# Patient Record
Sex: MALE | Race: WHITE | NOT HISPANIC OR LATINO
[De-identification: names, ages, dates, MRNs, and addresses within clinical notes are randomized per-mention and may not be internally consistent; named-entity substitution may affect disease eponyms.]

---

## 2017-06-26 ENCOUNTER — TRANSCRIPTION ENCOUNTER (OUTPATIENT)
Age: 27
End: 2017-06-26

## 2017-06-26 ENCOUNTER — INPATIENT (INPATIENT)
Facility: HOSPITAL | Age: 27
LOS: 1 days | Discharge: HOME | End: 2017-06-28
Attending: NEUROLOGICAL SURGERY | Admitting: NEUROLOGICAL SURGERY
Payer: COMMERCIAL

## 2017-06-26 VITALS
SYSTOLIC BLOOD PRESSURE: 123 MMHG | DIASTOLIC BLOOD PRESSURE: 78 MMHG | HEART RATE: 81 BPM | TEMPERATURE: 98 F | RESPIRATION RATE: 16 BRPM | OXYGEN SATURATION: 97 %

## 2017-06-26 NOTE — ED ADULT TRIAGE NOTE - CHIEF COMPLAINT QUOTE
PT c/o shunt malfunction. pt states has shunt for ICP and noticed swelling in the face and headache x 1 week. pt states now has unbearable headache.

## 2017-06-27 DIAGNOSIS — G91.9 HYDROCEPHALUS, UNSPECIFIED: ICD-10-CM

## 2017-06-27 DIAGNOSIS — T85.09XA OTHER MECHANICAL COMPLICATION OF VENTRICULAR INTRACRANIAL (COMMUNICATING) SHUNT, INITIAL ENCOUNTER: ICD-10-CM

## 2017-06-27 DIAGNOSIS — T85.09XA OTHER MECHANICAL COMPLICATION OF VENTRICULAR INTRACRANIAL (COMMUNICATING) SHUNT, INITIAL ENCOUNTER: Chronic | ICD-10-CM

## 2017-06-27 LAB
ALBUMIN SERPL ELPH-MCNC: 4.3 G/DL — SIGNIFICANT CHANGE UP (ref 3.3–5)
ALP SERPL-CCNC: 84 U/L — SIGNIFICANT CHANGE UP (ref 40–120)
ALT FLD-CCNC: 38 U/L — SIGNIFICANT CHANGE UP (ref 4–41)
APTT BLD: 32.1 SEC — SIGNIFICANT CHANGE UP (ref 27.5–37.4)
AST SERPL-CCNC: 28 U/L — SIGNIFICANT CHANGE UP (ref 4–40)
BASOPHILS # BLD AUTO: 0.02 K/UL — SIGNIFICANT CHANGE UP (ref 0–0.2)
BASOPHILS NFR BLD AUTO: 0.2 % — SIGNIFICANT CHANGE UP (ref 0–2)
BILIRUB SERPL-MCNC: 0.2 MG/DL — SIGNIFICANT CHANGE UP (ref 0.2–1.2)
BLD GP AB SCN SERPL QL: NEGATIVE — SIGNIFICANT CHANGE UP
BUN SERPL-MCNC: 23 MG/DL — SIGNIFICANT CHANGE UP (ref 7–23)
CALCIUM SERPL-MCNC: 9.3 MG/DL — SIGNIFICANT CHANGE UP (ref 8.4–10.5)
CHLORIDE SERPL-SCNC: 105 MMOL/L — SIGNIFICANT CHANGE UP (ref 98–107)
CLARITY CSF: SIGNIFICANT CHANGE UP
CO2 SERPL-SCNC: 24 MMOL/L — SIGNIFICANT CHANGE UP (ref 22–31)
COLOR CSF: SIGNIFICANT CHANGE UP
CREAT SERPL-MCNC: 1.14 MG/DL — SIGNIFICANT CHANGE UP (ref 0.5–1.3)
EOSINOPHIL # BLD AUTO: 0.17 K/UL — SIGNIFICANT CHANGE UP (ref 0–0.5)
EOSINOPHIL NFR BLD AUTO: 2 % — SIGNIFICANT CHANGE UP (ref 0–6)
GLUCOSE CSF-MCNC: 54 MG/DL — SIGNIFICANT CHANGE UP (ref 40–70)
GLUCOSE SERPL-MCNC: 86 MG/DL — SIGNIFICANT CHANGE UP (ref 70–99)
GRAM STN CSF: SIGNIFICANT CHANGE UP
HCT VFR BLD CALC: 50.2 % — HIGH (ref 39–50)
HGB BLD-MCNC: 17.1 G/DL — HIGH (ref 13–17)
IMM GRANULOCYTES NFR BLD AUTO: 0.5 % — SIGNIFICANT CHANGE UP (ref 0–1.5)
INR BLD: 1 — SIGNIFICANT CHANGE UP (ref 0.88–1.17)
LYMPHOCYTES # BLD AUTO: 2.64 K/UL — SIGNIFICANT CHANGE UP (ref 1–3.3)
LYMPHOCYTES # BLD AUTO: 31.7 % — SIGNIFICANT CHANGE UP (ref 13–44)
LYMPHOCYTES # CSF: 49 % — SIGNIFICANT CHANGE UP
MCHC RBC-ENTMCNC: 31 PG — SIGNIFICANT CHANGE UP (ref 27–34)
MCHC RBC-ENTMCNC: 34.1 % — SIGNIFICANT CHANGE UP (ref 32–36)
MCV RBC AUTO: 91.1 FL — SIGNIFICANT CHANGE UP (ref 80–100)
MONOCYTES # BLD AUTO: 0.7 K/UL — SIGNIFICANT CHANGE UP (ref 0–0.9)
MONOCYTES # CSF: 10 % — SIGNIFICANT CHANGE UP
MONOCYTES NFR BLD AUTO: 8.4 % — SIGNIFICANT CHANGE UP (ref 2–14)
NEUTROPHILS # BLD AUTO: 4.75 K/UL — SIGNIFICANT CHANGE UP (ref 1.8–7.4)
NEUTROPHILS NFR BLD AUTO: 57.2 % — SIGNIFICANT CHANGE UP (ref 43–77)
NEUTS SEG NFR CSF MANUAL: 41 % — SIGNIFICANT CHANGE UP
NRBC NFR CSF: 6 CELL/UL — HIGH (ref 0–5)
PLATELET # BLD AUTO: 235 K/UL — SIGNIFICANT CHANGE UP (ref 150–400)
PMV BLD: 9.6 FL — SIGNIFICANT CHANGE UP (ref 7–13)
POTASSIUM SERPL-MCNC: 4.3 MMOL/L — SIGNIFICANT CHANGE UP (ref 3.5–5.3)
POTASSIUM SERPL-SCNC: 4.3 MMOL/L — SIGNIFICANT CHANGE UP (ref 3.5–5.3)
PROT CSF-MCNC: 47.9 MG/DL — HIGH (ref 15–45)
PROT SERPL-MCNC: 7.3 G/DL — SIGNIFICANT CHANGE UP (ref 6–8.3)
PROTHROM AB SERPL-ACNC: 11.2 SEC — SIGNIFICANT CHANGE UP (ref 9.8–13.1)
RBC # BLD: 5.51 M/UL — SIGNIFICANT CHANGE UP (ref 4.2–5.8)
RBC # CSF: 4000 CELL/UL — HIGH (ref 0–0)
RBC # FLD: 12 % — SIGNIFICANT CHANGE UP (ref 10.3–14.5)
RH IG SCN BLD-IMP: POSITIVE — SIGNIFICANT CHANGE UP
RH IG SCN BLD-IMP: POSITIVE — SIGNIFICANT CHANGE UP
SODIUM SERPL-SCNC: 142 MMOL/L — SIGNIFICANT CHANGE UP (ref 135–145)
SPECIMEN SOURCE: SIGNIFICANT CHANGE UP
TOTAL CELLS COUNTED, SPINAL FLUID: 100 CELLS — SIGNIFICANT CHANGE UP
WBC # BLD: 8.32 K/UL — SIGNIFICANT CHANGE UP (ref 3.8–10.5)
WBC # FLD AUTO: 8.32 K/UL — SIGNIFICANT CHANGE UP (ref 3.8–10.5)
XANTHOCHROMIA: SIGNIFICANT CHANGE UP

## 2017-06-27 PROCEDURE — 70450 CT HEAD/BRAIN W/O DYE: CPT | Mod: 26

## 2017-06-27 PROCEDURE — 70250 X-RAY EXAM OF SKULL: CPT | Mod: 26

## 2017-06-27 PROCEDURE — 99223 1ST HOSP IP/OBS HIGH 75: CPT | Mod: 25

## 2017-06-27 PROCEDURE — 74000: CPT | Mod: 26

## 2017-06-27 PROCEDURE — 71020: CPT | Mod: 26

## 2017-06-27 RX ORDER — FENTANYL CITRATE 50 UG/ML
50 INJECTION INTRAVENOUS
Qty: 0 | Refills: 0 | Status: DISCONTINUED | OUTPATIENT
Start: 2017-06-27 | End: 2017-06-28

## 2017-06-27 RX ORDER — CEFAZOLIN SODIUM 1 G
2000 VIAL (EA) INJECTION EVERY 8 HOURS
Qty: 0 | Refills: 0 | Status: COMPLETED | OUTPATIENT
Start: 2017-06-27 | End: 2017-06-28

## 2017-06-27 RX ORDER — ACETAMINOPHEN 500 MG
650 TABLET ORAL EVERY 6 HOURS
Qty: 0 | Refills: 0 | Status: DISCONTINUED | OUTPATIENT
Start: 2017-06-27 | End: 2017-06-27

## 2017-06-27 RX ORDER — SODIUM CHLORIDE 9 MG/ML
1000 INJECTION INTRAMUSCULAR; INTRAVENOUS; SUBCUTANEOUS
Qty: 0 | Refills: 0 | Status: DISCONTINUED | OUTPATIENT
Start: 2017-06-27 | End: 2017-06-28

## 2017-06-27 RX ORDER — OXYCODONE HYDROCHLORIDE 5 MG/1
5 TABLET ORAL EVERY 4 HOURS
Qty: 0 | Refills: 0 | Status: DISCONTINUED | OUTPATIENT
Start: 2017-06-27 | End: 2017-06-28

## 2017-06-27 RX ORDER — ONDANSETRON 8 MG/1
4 TABLET, FILM COATED ORAL ONCE
Qty: 0 | Refills: 0 | Status: DISCONTINUED | OUTPATIENT
Start: 2017-06-27 | End: 2017-06-28

## 2017-06-27 RX ORDER — ACETAMINOPHEN 500 MG
650 TABLET ORAL EVERY 6 HOURS
Qty: 0 | Refills: 0 | Status: DISCONTINUED | OUTPATIENT
Start: 2017-06-27 | End: 2017-06-28

## 2017-06-27 RX ORDER — SENNA PLUS 8.6 MG/1
2 TABLET ORAL AT BEDTIME
Qty: 0 | Refills: 0 | Status: DISCONTINUED | OUTPATIENT
Start: 2017-06-27 | End: 2017-06-28

## 2017-06-27 RX ORDER — SODIUM CHLORIDE 9 MG/ML
1000 INJECTION INTRAMUSCULAR; INTRAVENOUS; SUBCUTANEOUS
Qty: 0 | Refills: 0 | Status: DISCONTINUED | OUTPATIENT
Start: 2017-06-27 | End: 2017-06-27

## 2017-06-27 RX ORDER — DOCUSATE SODIUM 100 MG
100 CAPSULE ORAL THREE TIMES A DAY
Qty: 0 | Refills: 0 | Status: DISCONTINUED | OUTPATIENT
Start: 2017-06-27 | End: 2017-06-28

## 2017-06-27 RX ADMIN — FENTANYL CITRATE 50 MICROGRAM(S): 50 INJECTION INTRAVENOUS at 20:45

## 2017-06-27 RX ADMIN — FENTANYL CITRATE 50 MICROGRAM(S): 50 INJECTION INTRAVENOUS at 21:00

## 2017-06-27 RX ADMIN — SENNA PLUS 2 TABLET(S): 8.6 TABLET ORAL at 23:32

## 2017-06-27 RX ADMIN — SODIUM CHLORIDE 75 MILLILITER(S): 9 INJECTION INTRAMUSCULAR; INTRAVENOUS; SUBCUTANEOUS at 20:53

## 2017-06-27 RX ADMIN — SODIUM CHLORIDE 75 MILLILITER(S): 9 INJECTION INTRAMUSCULAR; INTRAVENOUS; SUBCUTANEOUS at 12:41

## 2017-06-27 RX ADMIN — Medication 100 MILLIGRAM(S): at 23:32

## 2017-06-27 NOTE — H&P ADULT - HISTORY OF PRESENT ILLNESS
· HPI Objective Statement: 28yo male h/o hydrocephalus with  shunt p/w 1 week of headaches and papilledema noted today by ophtho. No fevers, chills, vomiting, vision changes, + nausea and intermittent dizziness. pt spoke with Dr Coyle neurosx and was told to come to ED for admission. · HPI Objective Statement: 28yo male h/o hydrocephalus with  shunt p/w 1 week of headaches and papilledema noted today by ophtho. No fevers, chills, vomiting, vision changes, + nausea and intermittent dizziness. pt spoke with Dr Coyle, neurosx and was told to come to ED for admission.	      ICU Vital Signs Last 24 Hrs  T(C): 36.6, Max: 36.6 (06-26 @ 23:50)  T(F): 97.8, Max: 97.8 (06-26 @ 23:50)  HR: 65 (65 - 81)  BP: 139/89 (123/78 - 139/89)  BP(mean): --  ABP: --  ABP(mean): --  RR: 16 (16 - 16)  SpO2: 100% (97% - 100%)

## 2017-06-27 NOTE — H&P ADULT - NSHPREVIEWOFSYSTEMS_GEN_ALL_CORE
REVIEW OF SYSTEMS:   Review of Systems:  · CONSTITUTIONAL: no fever and no chills.  · CARDIOVASCULAR: normal rate and rhythm, no chest pain and no edema.  · RESPIRATORY: no chest pain, no cough, and no shortness of breath.  · NEUROLOGICAL: - - -  · Neurological [+]: HEADACHE  · ROS STATEMENT: all other ROS negative except as per HPI

## 2017-06-27 NOTE — ED PROVIDER NOTE - ATTENDING CONTRIBUTION TO CARE
27m, h/o  shunt, p/w h/a x 1 week. sent by Dr. Coyle with NSx for admission. no h/a currently. no other neuro symptoms. exam, vs wnl ,nad. hs and lungs normal, abdo benign, no focal neuro symptoms, gcs 15. seen by nsx and admitted for  shunt revision tomorrow.

## 2017-06-27 NOTE — H&P ADULT - NSHPPHYSICALEXAM_GEN_ALL_CORE
PHYSICAL EXAM:   · CONSTITUTIONAL: Well appearing, well nourished, awake, alert, oriented to person, place, time/situation and in no apparent distress.  · EYES: Clear bilaterally, pupils equal, round and reactive to light.  · CARDIAC: Normal rate, regular rhythm.  Heart sounds S1, S2.  No murmurs, rubs or gallops.  · RESPIRATORY: Breath sounds clear and equal bilaterally.  · NEUROLOGICAL: - - -  · Cranial Nerve and Pupillary Exam: extra-ocular movements intact, CN3-12  · Speech: clear  · Sensation: present and normal in 4 extremities  · Coordination: normal  · Pronator Drift: none  · Motor: normal  · SKIN: Skin normal color for race, warm, dry and intact. No evidence of rash.

## 2017-06-27 NOTE — PROGRESS NOTE ADULT - SUBJECTIVE AND OBJECTIVE BOX
PREOP NOTE    Procedure: Intraoperative shuntogram, Revision of  shunt  Consent- signed by patient    Vital Signs Last 24 Hrs  T(C): 36.5 (27 Jun 2017 09:01), Max: 36.6 (26 Jun 2017 23:50)  T(F): 97.7 (27 Jun 2017 09:01), Max: 97.8 (26 Jun 2017 23:50)  HR: 58 (27 Jun 2017 09:01) (58 - 81)  BP: 108/59 (27 Jun 2017 09:01) (108/59 - 139/89)  BP(mean): --  RR: 16 (27 Jun 2017 09:01) (16 - 16)  SpO2: 96% (27 Jun 2017 09:01) (96% - 100%)      DIET:  [x ] NPO  [ ] Regular    LABS:                        17.1   8.32  )-----------( 235      ( 27 Jun 2017 00:30 )             50.2     06-27    142  |  105  |  23  ----------------------------<  86  4.3   |  24  |  1.14    Ca    9.3      27 Jun 2017 00:26    TPro  7.3  /  Alb  4.3  /  TBili  0.2  /  DBili  x   /  AST  28  /  ALT  38  /  AlkPhos  84  06-27    PT/INR - ( 27 Jun 2017 00:26 )   PT: 11.2 SEC;   INR: 1.00          PTT - ( 27 Jun 2017 00:26 )  PTT:32.1 SEC  Type and Screen-D        MEDICATIONS:  Antibiotics:    Neuro:  oxyCODONE  5 mG/acetaminophen 325 mG 1 Tablet(s) Oral every 4 hours PRN  acetaminophen   Tablet. 650 milliGRAM(s) Oral every 6 hours PRN    Anticoagulation    OTHER:    IVF:  sodium chloride 0.9%. 1000 milliLiter(s) IV Continuous <Continuous>        DVT PROPHYLAXIS:  [] Venodynes                                [] Heparin/Lovenox    RADIOLOGY & ADDITIONAL TESTS:    ASSESSMENT:  27y Male s/p    PLAN:    DISPOSITION:

## 2017-06-27 NOTE — BRIEF OPERATIVE NOTE - OPERATION/FINDINGS
Dry shunt tap  Intraoperative shuntogram revealing good distal flow  No proximal flow after opening   As much of old VPS as possible removed   New VPS with Delta @ 1.5 placed using stereotaxis

## 2017-06-27 NOTE — CONSULT NOTE ADULT - SUBJECTIVE AND OBJECTIVE BOX
SICU Consult Note    HPI: 27M with history of congenital hydrocephalus s/p  shunt 26 years ago presents with headaches for the past week.  He has experienced similar headaches before as migraines, which are usually resolved after 3 days with excedrine and tylenol.  This headache, however, did not resolve.  The headaches are worse with lying flat and are sometimes associated with nausea.  He went to the ophthalmologist yesterday morning, who noted papilledema and sent him to the ED.      MedHx: congenital hydrocephalus  SurgHx: s/p  shunt w/ multiple revisions in childhood  All: NKDA  SocHx: no smoking, drinking, drug abuse; lives at home with parents  FamHx: none  Meds: excedrine, adderol  ROS: no visual changes, no chest pain, shortness of breath, nausea/vomiting    Physical Exam    T(C): 36.4, Max: 36.6 (06-26 @ 23:50)  HR: 67 (65 - 81)  BP: 108/74 (108/74 - 139/89)  BP(mean): --  ABP: --  ABP(mean): --  RR: 16 (16 - 16)  SpO2: 99% (97% - 100%)    General: No distress, fully oriented, cranial nerves grossly intact  Resp: breathing unlabored  Abd: soft, nondistended  Ext: warm, well perfused, 5+ strength in b/l upper and lower extremities     Labs  CBC (06-27 @ 00:30)                          17.1<H>                   8.32    )--------------(  235        57.2  % Neuts, 31.7  % Lymphs, ANC: 4.75                            50.2<H>    BMP (06-27 @ 00:26)       142     |  105     |  23    			Ca++ --      Ca 9.3          ---------------------------------( 86    		Mg --           4.3     |  24      |  1.14  			Ph --        LFTs (06-27 @ 00:26)      TPro 7.3 / Alb 4.3 / TBili 0.2 / DBili -- / AST 28 / ALT 38 / AlkPhos 84    Coags (06-27 @ 00:26)  aPTT 32.1 / INR 1.00 / PT 11.2    CT Head: Status post right frontal and parietal approach shunt catheter placement   as described. No hydrocephalus.    A/P: 27M with hx congenital hydrocephalus with papilledema, no evidence of hydrocephalus on CT scan.   Neurologic checks every 4 hours  NPO for now for OR today  No need for ICU care at this time; will reevaluate after patient undergoes surgery today  Will discuss with Dr. Juan Alvarenga MD

## 2017-06-27 NOTE — H&P ADULT - ATTENDING COMMENTS
headache papilledema  shunt poorly vis on xray pf ok ct vent only small amt dil but difference from 6/22  suspect intermittent malx rb and pc explained and accepted for shuntogram and revision

## 2017-06-27 NOTE — PRE-OP CHECKLIST - SELECT TESTS ORDERED
Type and Cross/PT/PTT/INR/CBC/Type and Screen/CMP INR/CMP/PT/PTT/BMP/Type and Cross/Type and Screen/CBC

## 2017-06-27 NOTE — CONSULT NOTE ADULT - ATTENDING COMMENTS
27M with hx congenital hydrocephalus with papilledema, no evidence of hydrocephalus on CT scan.   Neurologic checks every 4 hours  NPO for now for OR today  No need for ICU care at this time; will reevaluate after patient undergoes surgery today.  Will foloow with continuum of care/[facility]  I have seen and examined the patient, reviewed the laboratory and radiologic data and agree with practitioner's history, physical assessment, plan and reviewed and edited where appropriate.    The patient is a critical care patient with hemodynamic and metabolic instability in SICU.   I have personally interviewed and examined this patient, reviewed labs and x-rays, discussed with other consultants, House staff and PA's.    I spent  56   min in total providing critical care for the diagnoses, assessment and plan above.  These diagnoses are unrelated to the surgical procedure noted above.  I met with family  0   min to get further history and make care decisions for this patient who is unable to participate due to altered mental status.  Time involved in performance of separately billable procedures was not counted toward my critical care time.  There is no overlap.

## 2017-06-27 NOTE — PROGRESS NOTE ADULT - PROBLEM SELECTOR PLAN 1
NPO with IV fluids  Consent obtained by patient   Dr. Coyle evaluated at bedside   Disposition after OR is PACU to regular floor

## 2017-06-27 NOTE — ED ADULT NURSE NOTE - OBJECTIVE STATEMENT
pt presents to ED R#24 Aox4, in NAD, c/o L-sided HA worseningx1 week. Taking OTC pain meds with some relief. Instructed to come to ED by neurosurgeon for admission for possible shunt malfunction (ICP shunt placed as infant). Had outpatient CT this week normal, eyes evaluated showed papilledema indicating +malfunction. Also c/o mild nausea and dizziness, denies vomiting/ vision changes/ fevers/ chills/ weakness/ other acute complaints. VSS. IV inserted, BW collected and sent to lab. Awaiting test results. Will continue to monitor.

## 2017-06-27 NOTE — H&P ADULT - NSHPLABSRESULTS_GEN_ALL_CORE
17.1   8.32  )-----------( 235      ( 27 Jun 2017 00:30 )             50.2     27 Jun 2017 00:26    142    |  105    |  23     ----------------------------<  86     4.3     |  24     |  1.14     Ca    9.3        27 Jun 2017 00:26    TPro  7.3    /  Alb  4.3    /  TBili  0.2    /  DBili  x      /  AST  28     /  ALT  38     /  AlkPhos  84     27 Jun 2017 00:26    LIVER FUNCTIONS - ( 27 Jun 2017 00:26 )  Alb: 4.3 g/dL / Pro: 7.3 g/dL / ALK PHOS: 84 u/L / ALT: 38 u/L / AST: 28 u/L / GGT: x           PT/INR - ( 27 Jun 2017 00:26 )   PT: 11.2 SEC;   INR: 1.00          PTT - ( 27 Jun 2017 00:26 )  PTT:32.1 SEC  CAPILLARY BLOOD GLUCOSE

## 2017-06-27 NOTE — ED PROVIDER NOTE - OBJECTIVE STATEMENT
26yo male h/o hydrocephalus with  shunt p/w 1 week of headaches and papilledema noted today by ophtho. No fevers, chills, vomiting, vision changes, + nausea and intermittent dizziness. pt spoke with anup Chapin and was told to come to ED for admission.

## 2017-06-28 VITALS
DIASTOLIC BLOOD PRESSURE: 81 MMHG | OXYGEN SATURATION: 99 % | TEMPERATURE: 98 F | RESPIRATION RATE: 16 BRPM | SYSTOLIC BLOOD PRESSURE: 114 MMHG | HEART RATE: 60 BPM

## 2017-06-28 PROCEDURE — 70460 CT HEAD/BRAIN W/DYE: CPT | Mod: 26

## 2017-06-28 PROCEDURE — 99233 SBSQ HOSP IP/OBS HIGH 50: CPT

## 2017-06-28 PROCEDURE — 70551 MRI BRAIN STEM W/O DYE: CPT | Mod: 26

## 2017-06-28 RX ORDER — OXYCODONE HYDROCHLORIDE 5 MG/1
10 TABLET ORAL EVERY 6 HOURS
Qty: 0 | Refills: 0 | Status: DISCONTINUED | OUTPATIENT
Start: 2017-06-28 | End: 2017-06-28

## 2017-06-28 RX ORDER — OXYCODONE HYDROCHLORIDE 5 MG/1
5 TABLET ORAL ONCE
Qty: 0 | Refills: 0 | Status: DISCONTINUED | OUTPATIENT
Start: 2017-06-28 | End: 2017-06-28

## 2017-06-28 RX ORDER — SENNA PLUS 8.6 MG/1
2 TABLET ORAL
Qty: 0 | Refills: 0 | COMMUNITY
Start: 2017-06-28

## 2017-06-28 RX ORDER — OXYCODONE HYDROCHLORIDE 5 MG/1
1 TABLET ORAL
Qty: 20 | Refills: 0 | OUTPATIENT
Start: 2017-06-28 | End: 2017-07-03

## 2017-06-28 RX ORDER — ACETAMINOPHEN 500 MG
2 TABLET ORAL
Qty: 0 | Refills: 0 | COMMUNITY
Start: 2017-06-28

## 2017-06-28 RX ORDER — DOCUSATE SODIUM 100 MG
1 CAPSULE ORAL
Qty: 0 | Refills: 0 | COMMUNITY
Start: 2017-06-28

## 2017-06-28 RX ADMIN — OXYCODONE HYDROCHLORIDE 5 MILLIGRAM(S): 5 TABLET ORAL at 07:35

## 2017-06-28 RX ADMIN — OXYCODONE HYDROCHLORIDE 5 MILLIGRAM(S): 5 TABLET ORAL at 08:10

## 2017-06-28 RX ADMIN — FENTANYL CITRATE 50 MICROGRAM(S): 50 INJECTION INTRAVENOUS at 04:15

## 2017-06-28 RX ADMIN — FENTANYL CITRATE 50 MICROGRAM(S): 50 INJECTION INTRAVENOUS at 04:00

## 2017-06-28 RX ADMIN — OXYCODONE HYDROCHLORIDE 5 MILLIGRAM(S): 5 TABLET ORAL at 07:00

## 2017-06-28 RX ADMIN — Medication 100 MILLIGRAM(S): at 13:33

## 2017-06-28 RX ADMIN — OXYCODONE HYDROCHLORIDE 10 MILLIGRAM(S): 5 TABLET ORAL at 13:33

## 2017-06-28 RX ADMIN — OXYCODONE HYDROCHLORIDE 10 MILLIGRAM(S): 5 TABLET ORAL at 14:03

## 2017-06-28 RX ADMIN — OXYCODONE HYDROCHLORIDE 5 MILLIGRAM(S): 5 TABLET ORAL at 06:28

## 2017-06-28 RX ADMIN — Medication 100 MILLIGRAM(S): at 06:28

## 2017-06-28 NOTE — DISCHARGE NOTE ADULT - NS AS ACTIVITY OBS
No Heavy lifting/straining/Walking-Outdoors allowed/Walking-Indoors allowed/Stairs allowed/may shower POD #4, d/c dressing tomorrow leave open to air

## 2017-06-28 NOTE — DISCHARGE NOTE ADULT - PATIENT PORTAL LINK FT
“You can access the [employer] Patient Portal, offered by [facility], by registering with the following website: [email or web address]”

## 2017-06-28 NOTE — DISCHARGE NOTE ADULT - CARE PROVIDER_API CALL
Alon Coyle), Neurological Surgery; Pediatric Neurological Surgery  [address] [place] [address]  Phone: [phone]  Fax: [phone]

## 2017-06-28 NOTE — PROGRESS NOTE ADULT - SUBJECTIVE AND OBJECTIVE BOX
SICU Progress Note    Interim event: Patient had placement of new  shunt, in PACU overnight for q1 hour neuro checks. Denies pain.    HPI: 27M with history of congenital hydrocephalus s/p  shunt 26 years ago presents with headaches for the past week.  He has experienced similar headaches before as migraines, which are usually resolved after 3 days with excedrine and tylenol.  This headache, however, did not resolve.  The headaches are worse with lying flat and are sometimes associated with nausea.  He went to the ophthalmologist yesterday morning, who noted papilledema and sent him to the ED.      MedHx: congenital hydrocephalus  SurgHx: s/p  shunt w/ multiple revisions in childhood  All: NKDA  SocHx: no smoking, drinking, drug abuse; lives at home with parents  FamHx: none  Meds: excedrine, adderol  ROS: no visual changes, no chest pain, shortness of breath, nausea/vomiting    Physical Exam  ICU Vital Signs Last 24 Hrs  T(C): 36.5 (27 Jun 2017 23:00), Max: 36.7 (27 Jun 2017 09:25)  T(F): 97.7 (27 Jun 2017 23:00), Max: 98.1 (27 Jun 2017 09:25)  HR: 67 (28 Jun 2017 02:00) (58 - 84)  BP: 120/67 (28 Jun 2017 01:00) (108/59 - 138/75)  BP(mean): --  ABP: --  ABP(mean): --  RR: 20 (28 Jun 2017 02:00) (11 - 20)  SpO2: 98% (28 Jun 2017 02:00) (95% - 100%)      General: No distress, fully oriented, cranial nerves grossly intact  HEENT: Right frontal bandage clean/dry.   Resp: breathing unlabored  Abd: soft, nondistended. epigastric port site clean/dry/intact.  Ext: warm, well perfused, 5+ strength in b/l upper and lower extremities     Labs  CBC (06-27 @ 00:30)                          17.1<H>                   8.32    )--------------(  235        57.2  % Neuts, 31.7  % Lymphs, ANC: 4.75                            50.2<H>    BMP (06-27 @ 00:26)       142     |  105     |  23    			Ca++ --      Ca 9.3          ---------------------------------( 86    		Mg --           4.3     |  24      |  1.14  			Ph --        LFTs (06-27 @ 00:26)      TPro 7.3 / Alb 4.3 / TBili 0.2 / DBili -- / AST 28 / ALT 38 / AlkPhos 84    Coags (06-27 @ 00:26)  aPTT 32.1 / INR 1.00 / PT 11.2      CT Head: Status post right frontal and parietal approach shunt catheter placement   as described. No hydrocephalus.    A/P: 27M with hx congenital hydrocephalus with papilledema, no evidence of hydrocephalus on CT scan. Now s/p placement of new  shunt.  Neurologic checks every 4 hours  NPO for now for OR today  No need for ICU care at this time; will reevaluate after patient undergoes surgery today  Will discuss with Dr. Juan Alvarenga MD

## 2017-06-28 NOTE — DISCHARGE NOTE ADULT - CARE PLAN
Principal Discharge DX:	Hydrocephalus  Goal:	s/p revision VPS  Instructions for follow-up, activity and diet:	activity-FWBAT, no contact sports, no gym classes

## 2017-06-28 NOTE — PROGRESS NOTE ADULT - SUBJECTIVE AND OBJECTIVE BOX
NEUROSURGERY--Post op Check POD# 0    Patient is a 27y old  Male who presents with a chief complaint of  Shunt Malfunction (27 Jun 2017 02:39)    HPI:  · HPI Objective Statement: 28yo male h/o hydrocephalus with  shunt p/w 1 week of headaches and papilledema noted today by ophtho. No fevers, chills, vomiting, vision changes, + nausea and intermittent dizziness. pt spoke with Dr Coyle neurosx and was told to come to ED for admission.	      ICU Vital Signs Last 24 Hrs  T(C): 36.6, Max: 36.6 (06-26 @ 23:50)  T(F): 97.8, Max: 97.8 (06-26 @ 23:50)  HR: 65 (65 - 81)  BP: 139/89 (123/78 - 139/89)  BP(mean): --  ABP: --  ABP(mean): --  RR: 16 (16 - 16)  SpO2: 100% (97% - 100%) (27 Jun 2017 02:39)    Exam    Awake ,alert, responsive conversant  c/o incisional pain  Pupils = R, EOM intact  FC+ , PONCE with goods strength 5/5 throughout  Wound dressing C/D/I  Tolerating  PO clears    Labs                          17.1   8.32  )-----------( 235      ( 27 Jun 2017 00:30 )             50.2     27 Jun 2017 00:26    142    |  105    |  23     ----------------------------<  86     4.3     |  24     |  1.14     Ca    9.3        27 Jun 2017 00:26    TPro  7.3    /  Alb  4.3    /  TBili  0.2    /  DBili  x      /  AST  28     /  ALT  38     /  AlkPhos  84     27 Jun 2017 00:26    LIVER FUNCTIONS - ( 27 Jun 2017 00:26 )  Alb: 4.3 g/dL / Pro: 7.3 g/dL / ALK PHOS: 84 u/L / ALT: 38 u/L / AST: 28 u/L / GGT: x           PT/INR - ( 27 Jun 2017 00:26 )   PT: 11.2 SEC;   INR: 1.00          PTT - ( 27 Jun 2017 00:26 )  PTT:32.1 SEC  CAPILLARY BLOOD GLUCOSE      Labs    Post op labs pending

## 2017-06-28 NOTE — DISCHARGE NOTE ADULT - HOSPITAL COURSE
26yo male h/o hydrocephalus with  shunt @ 1 month old, last revised @ age 3, p/w 1 week of headaches and papilledema noted today by ophtho. from outside office.  No fevers, chills, vomiting, vision changes, + nausea and intermittent dizziness. pt spoke with anup Chapin and was told to come to ED for admission. CT head and shunt series were performed, including intraop shuntogram and patient was found to have a distal VPS malfunction.  He underwent a distal ventriculoperitoneal shunt revision on 6/27/17, he tolerated procedure well, is tolerating regular diet, ambulating indpendently and is stable for discharge.

## 2017-06-28 NOTE — PROGRESS NOTE ADULT - ATTENDING COMMENTS
A/P: 27M with hx congenital hydrocephalus with papilledema, no evidence of hydrocephalus on CT scan. Now s/p placement of new  shunt.  Neurologic checks every 4 hours  D/C to floor  D/W SICU team

## 2017-06-28 NOTE — DISCHARGE NOTE ADULT - MEDICATION SUMMARY - MEDICATIONS TO TAKE
I will START or STAY ON the medications listed below when I get home from the hospital:    acetaminophen 325 mg oral tablet  -- 2 tab(s) by mouth every 6 hours, As needed, Mild Pain (1 - 3)  -- Indication: For prn pain    oxyCODONE 10 mg oral tablet  -- 1 tab(s) by mouth every 6 hours, As needed, Moderate Pain (4 - 6) MDD:4 tabs  -- Indication: For prn moderate pain    docusate sodium 100 mg oral capsule  -- 1 cap(s) by mouth 3 times a day  -- Indication: For bowel regimen    senna oral tablet  -- 2 tab(s) by mouth once a day (at bedtime)  -- Indication: For bowel regimen

## 2017-07-03 LAB — BACTERIA CSF CULT: SIGNIFICANT CHANGE UP

## 2019-03-21 NOTE — CONSULT NOTE ADULT - CONSULT REASON
[FreeTextEntry1] : 66 year old male with a past medical history of hypertension, hyperlipidemia, LVH, deafness (childhood trauma), GERD, presents for evaluation and management of an ascending aorta and aortic root aneurysm.\par \par The patient's medical records and diagnostic images were reviewed at the time of this office visit, and the following recommendation was made. Review of the imaging shows aortic pathology has remained stable and does not require surgical intervention at this time.\par Plan\par \par 1. Follow up in [facility] 2 years with CTA chest. \par 2. Continue medication regimen.\par 3. Follow up with cardiologist and PCP.\par 
Neurologic monitoring

## 2022-04-18 PROBLEM — G91.9 HYDROCEPHALUS, UNSPECIFIED: Chronic | Status: ACTIVE | Noted: 2017-06-27

## 2022-05-03 PROBLEM — Z00.00 ENCOUNTER FOR PREVENTIVE HEALTH EXAMINATION: Status: ACTIVE | Noted: 2022-05-03

## 2022-05-09 ENCOUNTER — APPOINTMENT (OUTPATIENT)
Dept: SURGERY | Facility: CLINIC | Age: 32
End: 2022-05-09
Payer: MEDICAID

## 2022-05-09 VITALS
HEART RATE: 86 BPM | HEIGHT: 66 IN | WEIGHT: 193 LBS | BODY MASS INDEX: 31.02 KG/M2 | DIASTOLIC BLOOD PRESSURE: 88 MMHG | TEMPERATURE: 97.3 F | SYSTOLIC BLOOD PRESSURE: 128 MMHG

## 2022-05-09 DIAGNOSIS — K42.9 UMBILICAL HERNIA W/OUT OBSTRUCTION OR GANGRENE: ICD-10-CM

## 2022-05-09 DIAGNOSIS — K46.9 UNSPECIFIED ABDOMINAL HERNIA W/OUT OBSTRUCTION OR GANGRENE: ICD-10-CM

## 2022-05-09 PROCEDURE — 99203 OFFICE O/P NEW LOW 30 MIN: CPT

## 2022-05-30 ENCOUNTER — TRANSCRIPTION ENCOUNTER (OUTPATIENT)
Age: 32
End: 2022-05-30

## 2022-05-31 ENCOUNTER — TRANSCRIPTION ENCOUNTER (OUTPATIENT)
Age: 32
End: 2022-05-31

## 2022-05-31 ENCOUNTER — APPOINTMENT (OUTPATIENT)
Dept: SURGERY | Facility: HOSPITAL | Age: 32
End: 2022-05-31

## 2022-05-31 ENCOUNTER — OUTPATIENT (OUTPATIENT)
Dept: OUTPATIENT SERVICES | Facility: HOSPITAL | Age: 32
LOS: 1 days | Discharge: HOME | End: 2022-05-31
Payer: MEDICAID

## 2022-05-31 VITALS
WEIGHT: 197.98 LBS | HEART RATE: 72 BPM | RESPIRATION RATE: 14 BRPM | SYSTOLIC BLOOD PRESSURE: 126 MMHG | HEIGHT: 66 IN | TEMPERATURE: 98 F | OXYGEN SATURATION: 95 % | DIASTOLIC BLOOD PRESSURE: 85 MMHG

## 2022-05-31 VITALS
DIASTOLIC BLOOD PRESSURE: 66 MMHG | HEART RATE: 58 BPM | RESPIRATION RATE: 15 BRPM | OXYGEN SATURATION: 98 % | SYSTOLIC BLOOD PRESSURE: 103 MMHG

## 2022-05-31 DIAGNOSIS — T85.09XA OTHER MECHANICAL COMPLICATION OF VENTRICULAR INTRACRANIAL (COMMUNICATING) SHUNT, INITIAL ENCOUNTER: Chronic | ICD-10-CM

## 2022-05-31 LAB
FLUAV AG NPH QL: SIGNIFICANT CHANGE UP
FLUBV AG NPH QL: SIGNIFICANT CHANGE UP
SARS-COV-2 RNA SPEC QL NAA+PROBE: SIGNIFICANT CHANGE UP

## 2022-05-31 PROCEDURE — 49561: CPT | Mod: GC

## 2022-05-31 PROCEDURE — 49568: CPT

## 2022-05-31 DEVICE — MESH VENTRAL PATCH 8.6 CM: Type: IMPLANTABLE DEVICE | Status: FUNCTIONAL

## 2022-05-31 DEVICE — MESH VENTRAL PATCH 6.6 CM: Type: IMPLANTABLE DEVICE | Status: FUNCTIONAL

## 2022-05-31 RX ORDER — SODIUM CHLORIDE 9 MG/ML
1000 INJECTION, SOLUTION INTRAVENOUS
Refills: 0 | Status: DISCONTINUED | OUTPATIENT
Start: 2022-05-31 | End: 2022-05-31

## 2022-05-31 RX ORDER — OXYCODONE HYDROCHLORIDE 5 MG/1
1 TABLET ORAL
Qty: 12 | Refills: 0
Start: 2022-05-31 | End: 2022-06-01

## 2022-05-31 RX ORDER — ACETAMINOPHEN 500 MG
1000 TABLET ORAL EVERY 6 HOURS
Refills: 0 | Status: COMPLETED | OUTPATIENT
Start: 2022-05-31 | End: 2022-05-31

## 2022-05-31 RX ORDER — HYDROMORPHONE HYDROCHLORIDE 2 MG/ML
0.5 INJECTION INTRAMUSCULAR; INTRAVENOUS; SUBCUTANEOUS
Refills: 0 | Status: DISCONTINUED | OUTPATIENT
Start: 2022-05-31 | End: 2022-05-31

## 2022-05-31 RX ORDER — ONDANSETRON 8 MG/1
4 TABLET, FILM COATED ORAL ONCE
Refills: 0 | Status: COMPLETED | OUTPATIENT
Start: 2022-05-31 | End: 2022-05-31

## 2022-05-31 RX ADMIN — Medication 1000 MILLIGRAM(S): at 10:45

## 2022-05-31 RX ADMIN — SODIUM CHLORIDE 75 MILLILITER(S): 9 INJECTION, SOLUTION INTRAVENOUS at 10:44

## 2022-05-31 RX ADMIN — ONDANSETRON 4 MILLIGRAM(S): 8 TABLET, FILM COATED ORAL at 10:45

## 2022-05-31 RX ADMIN — Medication 400 MILLIGRAM(S): at 10:55

## 2022-05-31 NOTE — ASU PATIENT PROFILE, ADULT - FALL HARM RISK - HARM RISK INTERVENTIONS

## 2022-05-31 NOTE — ASU DISCHARGE PLAN (ADULT/PEDIATRIC) - ASU DC SPECIAL INSTRUCTIONSFT
Take tylenol and motrin for pain, additional pain medication sent to your pharmacy IF needed.  Take outer dressing off in 24 hours, let strips fall off on their own.    WOUND CARE:  Please keep incisions clean and dry. Please do not Scrub or rub incisions. Do not use lotion or powder on incisions.   BATHING: Please do not submerge wound underwater. You may shower and/or sponge bathe.  ACTIVITY: No heavy lifting or straining. Otherwise, you may return to your usual level of physical activity. If you are taking narcotic pain medication (such as Percocet) DO NOT drive a car, operate machinery or make important decisions.  DIET: Return to your usual diet.  NOTIFY YOUR SURGEON IF: You have any bleeding that does not stop, any pus draining from your wound(s), any fever (over 100.4 F) or chills, persistent nausea/vomiting, persistent diarrhea, or if your pain is not controlled on your discharge pain medications.  FOLLOW-UP: Please follow up with your primary care physician in one week regarding your hospitalization. Please follow-up with your surgeon, within 7 days following discharge- please call to schedule an appointment.

## 2022-05-31 NOTE — ASU DISCHARGE PLAN (ADULT/PEDIATRIC) - NS MD DC FALL RISK RISK
For information on Fall & Injury Prevention, visit: [email or web address] OR  [email or web address] OR  https://www.cdc.gov/steadi/patient.html

## 2022-06-02 DIAGNOSIS — Z79.891 LONG TERM (CURRENT) USE OF OPIATE ANALGESIC: ICD-10-CM

## 2022-06-02 DIAGNOSIS — K43.6 OTHER AND UNSPECIFIED VENTRAL HERNIA WITH OBSTRUCTION, WITHOUT GANGRENE: ICD-10-CM

## 2022-06-06 ENCOUNTER — APPOINTMENT (OUTPATIENT)
Dept: SURGERY | Facility: CLINIC | Age: 32
End: 2022-06-06
Payer: MEDICAID

## 2022-06-06 VITALS
HEIGHT: 66 IN | WEIGHT: 188 LBS | TEMPERATURE: 96.8 F | HEART RATE: 71 BPM | DIASTOLIC BLOOD PRESSURE: 86 MMHG | SYSTOLIC BLOOD PRESSURE: 125 MMHG | BODY MASS INDEX: 30.22 KG/M2

## 2022-06-06 PROCEDURE — 99024 POSTOP FOLLOW-UP VISIT: CPT

## 2022-06-20 ENCOUNTER — APPOINTMENT (OUTPATIENT)
Dept: SURGERY | Facility: CLINIC | Age: 32
End: 2022-06-20
Payer: MEDICAID

## 2022-06-20 VITALS
SYSTOLIC BLOOD PRESSURE: 128 MMHG | TEMPERATURE: 96.9 F | HEART RATE: 96 BPM | WEIGHT: 188 LBS | DIASTOLIC BLOOD PRESSURE: 88 MMHG | BODY MASS INDEX: 30.22 KG/M2 | HEIGHT: 66 IN

## 2022-06-20 PROCEDURE — 99024 POSTOP FOLLOW-UP VISIT: CPT

## 2023-12-15 NOTE — PRE-OP CHECKLIST - HAIR REMOVAL
[de-identified] : The patient is doing well after joint replacement surgery. Written infectious precautions were reviewed. The patient will progress with physical therapy at this time and they will work on transitioning from requiring assistive devices for ambulation. Anti-coagulant therapy will be discontinued at 1 month post surgery for the purpose of orthopedic thromboembolism prophylaxis. Return around the 6 week anniversary from surgery for follow-up evaluation.   The patient returns to the office today for staple removal.  The staples have been in since surgery. The patient has no complaints. The wound is healing well and is without evidence of infection or wound dehiscence. The wound was prepped with betadine and a staple removal tool was used to remove all staples in their entirety. Steri strips were placed over the wound and the patient was instructed to leave these in place until they fall off on their own. The patient tolerated the procedure well and there were no immediate complications. The wound edges are well adhered. The patient was instructed to continue to keep it clean. .  hair removal not indicated

## (undated) DEVICE — DRSG STERISTRIPS 0.5 X 4"

## (undated) DEVICE — FRA-ESU BOVIE FORCE FX F3B25575A: Type: DURABLE MEDICAL EQUIPMENT

## (undated) DEVICE — DRAPE LAPAROTOMY TRANSVERSE

## (undated) DEVICE — VENODYNE/SCD SLEEVE CALF MEDIUM

## (undated) DEVICE — NDL HYPO SAFE 22G X 1.5" (BLACK)

## (undated) DEVICE — PACK MINOR WITH LAP

## (undated) DEVICE — WARMING BLANKET UPPER ADULT

## (undated) DEVICE — GLV 7.5 PROTEXIS (WHITE)